# Patient Record
Sex: FEMALE | Race: WHITE | NOT HISPANIC OR LATINO | URBAN - METROPOLITAN AREA
[De-identification: names, ages, dates, MRNs, and addresses within clinical notes are randomized per-mention and may not be internally consistent; named-entity substitution may affect disease eponyms.]

---

## 2021-04-23 ENCOUNTER — EMERGENCY (EMERGENCY)
Facility: HOSPITAL | Age: 76
LOS: 1 days | Discharge: HOME | End: 2021-04-23
Attending: EMERGENCY MEDICINE | Admitting: EMERGENCY MEDICINE
Payer: MEDICARE

## 2021-04-23 VITALS
WEIGHT: 195.11 LBS | DIASTOLIC BLOOD PRESSURE: 84 MMHG | HEART RATE: 74 BPM | HEIGHT: 69 IN | TEMPERATURE: 97 F | RESPIRATION RATE: 18 BRPM | OXYGEN SATURATION: 97 % | SYSTOLIC BLOOD PRESSURE: 128 MMHG

## 2021-04-23 DIAGNOSIS — Z90.710 ACQUIRED ABSENCE OF BOTH CERVIX AND UTERUS: Chronic | ICD-10-CM

## 2021-04-23 DIAGNOSIS — Z90.49 ACQUIRED ABSENCE OF OTHER SPECIFIED PARTS OF DIGESTIVE TRACT: Chronic | ICD-10-CM

## 2021-04-23 LAB
ALBUMIN SERPL ELPH-MCNC: 4.1 G/DL — SIGNIFICANT CHANGE UP (ref 3.3–5)
ALP SERPL-CCNC: 85 U/L — SIGNIFICANT CHANGE UP (ref 40–120)
ALT FLD-CCNC: 17 U/L — SIGNIFICANT CHANGE UP (ref 12–78)
ANION GAP SERPL CALC-SCNC: 7 MMOL/L — SIGNIFICANT CHANGE UP (ref 5–17)
APTT BLD: 31.5 SEC — SIGNIFICANT CHANGE UP (ref 27.5–35.5)
AST SERPL-CCNC: 13 U/L — LOW (ref 15–37)
BASOPHILS # BLD AUTO: 0 K/UL — SIGNIFICANT CHANGE UP (ref 0–0.2)
BASOPHILS NFR BLD AUTO: 0 % — SIGNIFICANT CHANGE UP (ref 0–2)
BILIRUB SERPL-MCNC: 0.4 MG/DL — SIGNIFICANT CHANGE UP (ref 0.2–1.2)
BUN SERPL-MCNC: 26 MG/DL — HIGH (ref 7–23)
CALCIUM SERPL-MCNC: 10.5 MG/DL — HIGH (ref 8.5–10.1)
CHLORIDE SERPL-SCNC: 101 MMOL/L — SIGNIFICANT CHANGE UP (ref 96–108)
CO2 SERPL-SCNC: 29 MMOL/L — SIGNIFICANT CHANGE UP (ref 22–31)
CREAT SERPL-MCNC: 0.92 MG/DL — SIGNIFICANT CHANGE UP (ref 0.5–1.3)
EOSINOPHIL # BLD AUTO: 0 K/UL — SIGNIFICANT CHANGE UP (ref 0–0.5)
EOSINOPHIL NFR BLD AUTO: 0 % — SIGNIFICANT CHANGE UP (ref 0–6)
GLUCOSE SERPL-MCNC: 121 MG/DL — HIGH (ref 70–99)
HCT VFR BLD CALC: 38.9 % — SIGNIFICANT CHANGE UP (ref 34.5–45)
HGB BLD-MCNC: 13.2 G/DL — SIGNIFICANT CHANGE UP (ref 11.5–15.5)
INR BLD: 1.05 RATIO — SIGNIFICANT CHANGE UP (ref 0.88–1.16)
LIDOCAIN IGE QN: 82 U/L — SIGNIFICANT CHANGE UP (ref 73–393)
LYMPHOCYTES # BLD AUTO: 0.64 K/UL — LOW (ref 1–3.3)
LYMPHOCYTES # BLD AUTO: 8 % — LOW (ref 13–44)
MAGNESIUM SERPL-MCNC: 2.4 MG/DL — SIGNIFICANT CHANGE UP (ref 1.6–2.6)
MANUAL SMEAR VERIFICATION: SIGNIFICANT CHANGE UP
MCHC RBC-ENTMCNC: 29.6 PG — SIGNIFICANT CHANGE UP (ref 27–34)
MCHC RBC-ENTMCNC: 33.9 GM/DL — SIGNIFICANT CHANGE UP (ref 32–36)
MCV RBC AUTO: 87.2 FL — SIGNIFICANT CHANGE UP (ref 80–100)
MONOCYTES # BLD AUTO: 0.64 K/UL — SIGNIFICANT CHANGE UP (ref 0–0.9)
MONOCYTES NFR BLD AUTO: 8 % — SIGNIFICANT CHANGE UP (ref 2–14)
NEUTROPHILS # BLD AUTO: 6.35 K/UL — SIGNIFICANT CHANGE UP (ref 1.8–7.4)
NEUTROPHILS NFR BLD AUTO: 78 % — HIGH (ref 43–77)
NEUTS BAND # BLD: 1 % — SIGNIFICANT CHANGE UP (ref 0–8)
NRBC # BLD: 0 — SIGNIFICANT CHANGE UP
NRBC # BLD: SIGNIFICANT CHANGE UP /100 WBCS (ref 0–0)
PLAT MORPH BLD: NORMAL — SIGNIFICANT CHANGE UP
PLATELET # BLD AUTO: 245 K/UL — SIGNIFICANT CHANGE UP (ref 150–400)
POTASSIUM SERPL-MCNC: 3.7 MMOL/L — SIGNIFICANT CHANGE UP (ref 3.5–5.3)
POTASSIUM SERPL-SCNC: 3.7 MMOL/L — SIGNIFICANT CHANGE UP (ref 3.5–5.3)
PROT SERPL-MCNC: 8.2 G/DL — SIGNIFICANT CHANGE UP (ref 6–8.3)
PROTHROM AB SERPL-ACNC: 12.3 SEC — SIGNIFICANT CHANGE UP (ref 10.6–13.6)
RBC # BLD: 4.46 M/UL — SIGNIFICANT CHANGE UP (ref 3.8–5.2)
RBC # FLD: 13.5 % — SIGNIFICANT CHANGE UP (ref 10.3–14.5)
RBC BLD AUTO: NORMAL — SIGNIFICANT CHANGE UP
SODIUM SERPL-SCNC: 137 MMOL/L — SIGNIFICANT CHANGE UP (ref 135–145)
TROPONIN I SERPL-MCNC: <.015 NG/ML — SIGNIFICANT CHANGE UP (ref 0.01–0.04)
VARIANT LYMPHS # BLD: 5 % — SIGNIFICANT CHANGE UP (ref 0–6)
WBC # BLD: 8.04 K/UL — SIGNIFICANT CHANGE UP (ref 3.8–10.5)
WBC # FLD AUTO: 8.04 K/UL — SIGNIFICANT CHANGE UP (ref 3.8–10.5)

## 2021-04-23 PROCEDURE — 85025 COMPLETE CBC W/AUTO DIFF WBC: CPT

## 2021-04-23 PROCEDURE — 99283 EMERGENCY DEPT VISIT LOW MDM: CPT

## 2021-04-23 PROCEDURE — 80053 COMPREHEN METABOLIC PANEL: CPT

## 2021-04-23 PROCEDURE — 71045 X-RAY EXAM CHEST 1 VIEW: CPT | Mod: 26

## 2021-04-23 PROCEDURE — 99285 EMERGENCY DEPT VISIT HI MDM: CPT

## 2021-04-23 PROCEDURE — 85730 THROMBOPLASTIN TIME PARTIAL: CPT

## 2021-04-23 PROCEDURE — 76700 US EXAM ABDOM COMPLETE: CPT | Mod: 26

## 2021-04-23 PROCEDURE — 93005 ELECTROCARDIOGRAM TRACING: CPT

## 2021-04-23 PROCEDURE — 83690 ASSAY OF LIPASE: CPT

## 2021-04-23 PROCEDURE — 83735 ASSAY OF MAGNESIUM: CPT

## 2021-04-23 PROCEDURE — 96375 TX/PRO/DX INJ NEW DRUG ADDON: CPT

## 2021-04-23 PROCEDURE — 76700 US EXAM ABDOM COMPLETE: CPT

## 2021-04-23 PROCEDURE — 71045 X-RAY EXAM CHEST 1 VIEW: CPT

## 2021-04-23 PROCEDURE — 99284 EMERGENCY DEPT VISIT MOD MDM: CPT | Mod: 25

## 2021-04-23 PROCEDURE — 93010 ELECTROCARDIOGRAM REPORT: CPT

## 2021-04-23 PROCEDURE — 36415 COLL VENOUS BLD VENIPUNCTURE: CPT

## 2021-04-23 PROCEDURE — 85610 PROTHROMBIN TIME: CPT

## 2021-04-23 PROCEDURE — 96374 THER/PROPH/DIAG INJ IV PUSH: CPT

## 2021-04-23 PROCEDURE — 84484 ASSAY OF TROPONIN QUANT: CPT

## 2021-04-23 RX ORDER — PANTOPRAZOLE SODIUM 20 MG/1
1 TABLET, DELAYED RELEASE ORAL
Qty: 30 | Refills: 0
Start: 2021-04-23 | End: 2021-05-22

## 2021-04-23 RX ORDER — ONDANSETRON 8 MG/1
4 TABLET, FILM COATED ORAL ONCE
Refills: 0 | Status: COMPLETED | OUTPATIENT
Start: 2021-04-23 | End: 2021-04-23

## 2021-04-23 RX ORDER — FAMOTIDINE 10 MG/ML
20 INJECTION INTRAVENOUS ONCE
Refills: 0 | Status: COMPLETED | OUTPATIENT
Start: 2021-04-23 | End: 2021-04-23

## 2021-04-23 RX ORDER — ONDANSETRON 8 MG/1
1 TABLET, FILM COATED ORAL
Qty: 30 | Refills: 0
Start: 2021-04-23

## 2021-04-23 RX ADMIN — FAMOTIDINE 20 MILLIGRAM(S): 10 INJECTION INTRAVENOUS at 21:00

## 2021-04-23 RX ADMIN — ONDANSETRON 4 MILLIGRAM(S): 8 TABLET, FILM COATED ORAL at 21:01

## 2021-04-23 NOTE — ED PROVIDER NOTE - OBJECTIVE STATEMENT
pt is a 74 yo f who has hx of htn sp hyst appy remote former smoker whose pmd is dr kaba primo was in her usoh until 3 pm today when she became suddenly nauseated with stomach pain in epigastrium. she soon vomited and had regurg pain into chest (drawing a line up and down mid chest).  still feeling nauseated she went to urgent care where she had a rapid covid test (neg) and an ekg with inferior q waves otherwise normal then was sent to er for eval of her persistent nausea.  no ill contacts no trauma no travel no cp nosob no diarrhea no other co pt is a 76 yo f who has hx of htn sp hyst appy remote former smoker whose pmd is dr kaba primo was in her usoh until 3 pm today when she became suddenly nauseated with stomach pain in epigastrium. she soon vomited and had regurg pain into chest (drawing a line up and down mid chest).  still feeling nauseated she went to urgent care where she had a rapid covid test (neg) and an ekg with inferior q waves otherwise normal then was sent to er for eval of her persistent nausea.  no ill contacts no trauma no travel no cp no sob no diarrhea no other co

## 2021-04-23 NOTE — CONSULT NOTE ADULT - NSICDXPROBLEMREC1_GEN_ALL_CORE_FT
Assessment:   74 y/o F pmhx htn presenting to [facility] ED with epigastric/upper abdominal pain with nausea and one episode of vomiting since earlier today now resolved, with normal wbc, normal lfts and GB us findings of cholelithiasis, sludge and negative sonographic pond sign, likely biliary colic,   Plan:  - given first episode of biliary colic and now resolved abdominal pain and nausea coupled with GB US negative for acute danyell and normal wbc, lfts, surgically cleared for d/c home to follow up outpt for elective cholecystectomy  - follow up with Dr. Kuhn in office  - cont care per ED  - discussed with Dr. Kuhn Assessment:   74 y/o F pmhx htn presenting to [facility] ED with epigastric/upper abdominal pain with nausea and one episode of vomiting since earlier today now resolved, with normal wbc, normal lfts and GB us findings of cholelithiasis, sludge and negative sonographic   pond sign, likely biliary colic,   Plan:  - given first episode of biliary colic and now resolved abdominal pain and nausea coupled with GB US negative for acute danyell and normal wbc, lfts, surgically cleared for d/c home to follow up outpt for elective cholecystectomy  - follow up with Dr. Kuhn in office  - cont care per ED  - discussed with Dr. Kuhn

## 2021-04-23 NOTE — ED ADULT NURSE NOTE - OBJECTIVE STATEMENT
pt RUQ abd pain, radiates up mid chest. emesisx1. went to  and was sent to ED. pt RUQ abd pain, radiates up mid chest. unable to describe. began at 3pm. emesisx1. went to  and was sent to ED.

## 2021-04-23 NOTE — ED PROVIDER NOTE - PROGRESS NOTE DETAILS
still having discomfort, US results explained, surg PA will see patient seen by surg PA, recommend no acute surgical intervention, can f/u with Dr. Alicia out patient

## 2021-04-23 NOTE — ED PROVIDER NOTE - NSFOLLOWUPINSTRUCTIONS_ED_ALL_ED_FT
zofran for nausea  protonix for acid  follow up with your primary care doctor  Clear liquid diet advance slowly as tolerated to  Bananas Rice Tea and Toast (with margarine or jam)  then advance to Baked and boiled (eggs, pasta, chicken)  once tolerated you may advance slowly to regular  Eat small volumes   NO fatty fried foods, no milk or mild products, no tomato, spice, mint, tobacco, alcohol, caffeine  Stay well hydrated: a teaspoon at a time until able to tolerate normal intake.  Prompt follow up with your doctor is essential  if given zofran, use this every 6 hours for nausea.  return for worsening symptoms or any problems/concerns zofran for nausea  protonix for acid  follow up with your primary care doctor and Dr. Alicia  Clear liquid diet advance slowly as tolerated to  Bananas Rice Tea and Toast (with margarine or jam)  then advance to Baked and boiled (eggs, pasta, chicken)  once tolerated you may advance slowly to regular  Eat small volumes   NO fatty fried foods, no milk or mild products, no tomato, spice, mint, tobacco, alcohol, caffeine  Stay well hydrated: a teaspoon at a time until able to tolerate normal intake.  Prompt follow up with your doctor is essential  if given zofran, use this every 6 hours for nausea.  return for worsening symptoms or any problems/concerns

## 2021-04-23 NOTE — ED PROVIDER NOTE - CARE PLAN
Principal Discharge DX:	Epigastric abdominal pain  Secondary Diagnosis:	Nausea and vomiting   Principal Discharge DX:	Epigastric abdominal pain  Secondary Diagnosis:	Nausea and vomiting  Secondary Diagnosis:	Cholelithiasis

## 2021-04-23 NOTE — ED ADULT NURSE NOTE - NS TRANSFER PATIENT BELONGINGS
Clothing 1 pair earrings,/Jewelry/Money (specify)/Clothing 2 pair earrings/Cell Phone/PDA (specify)/Jewelry/Money (specify)/Clothing

## 2021-04-23 NOTE — CONSULT NOTE ADULT - SUBJECTIVE AND OBJECTIVE BOX
74 y/o F pmhx htn, presents to [facility] ED c/o epigastric, upper abdominal pain since 3 pm today. PT states she was in usoh prior to onset of symptoms, describing the pain as going "up and down her abdomen to her throat", without radiation. PT admits to pain in the RUQ with nausea and episode of vomiting. PT describes the emesis as "sausage she had for breakfast". Pt states she initially went to an urgent care and underwent a covid test (negative) and an ekg however due to persistent nausea was prompted to visit the ED. At this time pt states the abdominal pain is improved and nausea subsided. PT denies previous hx of similar symptoms. PT denies chest pain, SOB, palpitations, fevers, chills, melena, hematochezia, recent travel or sick contacts.         PAST MEDICAL & SURGICAL HISTORY:  Hypertension  S/P hysterectomy  S/P appy    Allergies:  No Known Allergies    Home Medications:  metoprolol tartrate 50 mg oral tablet: 1 tab(s) orally once a day  quinapril-hydrochlorothiazide 20mg-25mg oral tablet: 1 tab(s) orally once a day      Family History:  FAMILY HISTORY:      ROS:  Constitutional: Denies fever, fatigue or weight loss.  Skin: Denies rash.  Eyes: Denies recent vision problems or eye pain.  ENT: Denies congestion, ear pain, or sore throat.  Endocrine: Denies thyroid problems.  Cardiovascular: Denies chest pain or palpation.  Respiratory: Denies cough, shortness of breath, congestion, or wheezing.  Gastrointestinal: SEE HPI  Genitourinary: Denies dysuria.  Musculoskeletal: Denies joint swelling.  Neurologic: Denies headache.      Physical Examination:  GENERAL: No acute distress, well-developed  HEAD:  Atraumatic, Normocephalic  CHEST/LUNG: CTABL, no ronchi, rales or wheezing  HEART: RRR, no MRGs  ABDOMEN: soft, minimal ruq ttp, nondistended, +bs  NEUROLOGY: A&O x 3, no focal deficits    Data:                        13.2   8.04  )-----------( 245      ( 23 Apr 2021 21:00 )             38.9     04-23    137  |  101  |  26<H>  ----------------------------<  121<H>  3.7   |  29  |  0.92    Ca    10.5<H>      23 Apr 2021 21:00  Mg     2.4     04-23    TPro  8.2  /  Alb  4.1  /  TBili  0.4  /  DBili  x   /  AST  13<L>  /  ALT  17  /  AlkPhos  85  04-23      LIVER FUNCTIONS - ( 23 Apr 2021 21:00 )  Alb: 4.1 g/dL / Pro: 8.2 g/dL / ALK PHOS: 85 U/L / ALT: 17 U/L / AST: 13 U/L / GGT: x           Radiology:  < from: US Abdomen Complete (US Abdomen Complete .) (04.23.21 @ 22:52) >    EXAM:  US ABDOMEN COMPLETE                            PROCEDURE DATE:  04/23/2021      INTERPRETATION:  CLINICAL INFORMATION: Abdominal pain and vomiting    COMPARISON: None available.    TECHNIQUE: Sonography of the abdomen.    FINDINGS:    Liver: Increased echogenicity.  Bile ducts: Normal caliber. Common bile duct measures 4 (mm).  Gallbladder: Cholelithiasis and sludge. Mild wall thickening of 4 mm. No focal tenderness.  Pancreas: Visualized portions are within normal limits.  Spleen: 8.9 cm. Within normal limits.  Right kidney: 10.2 cm. No hydronephrosis.  Left kidney: 8.1 cm.  No hydronephrosis.  Ascites: None.  Aorta and IVC: Visualized portions are within normal limits.    IMPRESSION:  Cholelithiasis and gallbladder wall thickening with a negative sonographic Schwab sign.    CHRISTOPHER GARRETT MD; Attending Radiologist  This document has been electronically signed. Apr 23 2021 11:18PM    < end of copied text >

## 2021-04-23 NOTE — ED PROVIDER NOTE - CLINICAL SUMMARY MEDICAL DECISION MAKING FREE TEXT BOX
epigastric abd pain followed by nausea and vomiting with continued pain sent from urgent care for evaluation  no changes on ekg large bmi no sig other pmhx  ro acute gb ro pn ro ptx ro pancreatitis  labs us ekg re eval ppi antiemetic

## 2021-04-23 NOTE — ED ADULT TRIAGE NOTE - CHIEF COMPLAINT QUOTE
Pt states she was at urgent care for abdominal pain radiating to chest, sent for eval of abnormal EKG and vomiting

## 2021-04-23 NOTE — ED PROVIDER NOTE - CONSTITUTIONAL, MLM
normal... Well appearing, awake, large bmi alert, oriented to person, place, time/situation and in no apparent distress. pleasant making jokes with  at bedside

## 2021-04-23 NOTE — ED PROVIDER NOTE - CARE PROVIDER_API CALL
Dayton Kuhn)  ColonRectal Surgery; Surgery  [address] [place] [address]  Phone: [phone]  Fax: [phone]  Follow Up Time:

## 2021-04-23 NOTE — ED PROVIDER NOTE - PATIENT PORTAL LINK FT
You can access the FollowMyHealth Patient Portal offered by [facility] by registering at the following website: [email or web address]. By joining [facility]’s FollowMyHealth portal, you will also be able to view your health information using other applications (apps) compatible with our system.

## 2021-04-24 VITALS
OXYGEN SATURATION: 98 % | HEART RATE: 79 BPM | RESPIRATION RATE: 16 BRPM | DIASTOLIC BLOOD PRESSURE: 84 MMHG | SYSTOLIC BLOOD PRESSURE: 146 MMHG | TEMPERATURE: 98 F

## 2021-04-24 DIAGNOSIS — K80.50 CALCULUS OF BILE DUCT WITHOUT CHOLANGITIS OR CHOLECYSTITIS WITHOUT OBSTRUCTION: ICD-10-CM

## 2021-05-04 PROBLEM — I10 ESSENTIAL (PRIMARY) HYPERTENSION: Chronic | Status: ACTIVE | Noted: 2021-04-23

## 2021-05-10 ENCOUNTER — OUTPATIENT (OUTPATIENT)
Dept: OUTPATIENT SERVICES | Facility: HOSPITAL | Age: 76
LOS: 1 days | End: 2021-05-10
Payer: MEDICARE

## 2021-05-10 VITALS
OXYGEN SATURATION: 97 % | TEMPERATURE: 98 F | HEART RATE: 80 BPM | WEIGHT: 201.06 LBS | SYSTOLIC BLOOD PRESSURE: 145 MMHG | HEIGHT: 68 IN | RESPIRATION RATE: 16 BRPM | DIASTOLIC BLOOD PRESSURE: 82 MMHG

## 2021-05-10 DIAGNOSIS — K80.12 CALCULUS OF GALLBLADDER WITH ACUTE AND CHRONIC CHOLECYSTITIS WITHOUT OBSTRUCTION: ICD-10-CM

## 2021-05-10 DIAGNOSIS — Z90.49 ACQUIRED ABSENCE OF OTHER SPECIFIED PARTS OF DIGESTIVE TRACT: Chronic | ICD-10-CM

## 2021-05-10 DIAGNOSIS — Z90.710 ACQUIRED ABSENCE OF BOTH CERVIX AND UTERUS: Chronic | ICD-10-CM

## 2021-05-10 DIAGNOSIS — Z01.818 ENCOUNTER FOR OTHER PREPROCEDURAL EXAMINATION: ICD-10-CM

## 2021-05-10 PROCEDURE — 86900 BLOOD TYPING SEROLOGIC ABO: CPT

## 2021-05-10 PROCEDURE — 36415 COLL VENOUS BLD VENIPUNCTURE: CPT

## 2021-05-10 PROCEDURE — 86850 RBC ANTIBODY SCREEN: CPT

## 2021-05-10 PROCEDURE — G0463: CPT

## 2021-05-10 PROCEDURE — 86901 BLOOD TYPING SEROLOGIC RH(D): CPT

## 2021-05-10 NOTE — H&P PST ADULT - HISTORY OF PRESENT ILLNESS
76 yo female with HTN, presents to [facility] scheduled for a laparoscopic cholecystectomy on 5/18 with Dr. Nation. Presented to the ED with acute abdominal pain with vomitting after first going to , who referred patient to the ER. Denies previous H/O gastro issues. Denies gastritis, nausea and bowel changes.

## 2021-05-10 NOTE — H&P PST ADULT - NSICDXPROBLEM_GEN_ALL_CORE_FT
PROBLEM DIAGNOSES  Problem: Calculus of gallbladder with acute and chronic cholecystitis without obstruction  Assessment and Plan: scheduled for a laparoscopic cholecystectomy on 5/18 with Dr. Nation        Problem: Pre-op evaluation  Assessment and Plan: Labs  To make appt for COVID PCR 48-72 before DOS.  Pre op and Hibiclens instructions reviewed and given. Take routine am meds DOS with sip of water. Instructed to hold and/or avoid other NSAIDs and OTC supplements. Tylenol is ok. Verbalized understanding        PROBLEM DIAGNOSES  Problem: Calculus of gallbladder with acute and chronic cholecystitis without obstruction  Assessment and Plan: scheduled for a laparoscopic cholecystectomy on 5/18 with Dr. Nation        Problem: Pre-op evaluation  Assessment and Plan: Labs  - CBC, CMP, PT/PTT, EKG and CXR on chart form 4/23. T&S pending. To make appt for COVID PCR 48-72 before DOS.  MC requested from PCP.   Pre op and Hibiclens instructions reviewed and given. Take routine am Metoprolol DOS with sip of water. Hold the diuertic DOS. Instructed to hold and/or avoid other NSAIDs and OTC supplements. Tylenol is ok. Verbalized understanding

## 2021-05-10 NOTE — H&P PST ADULT - NS PRO FEM REPRO HEALTH SCREEN
Transition of Care  RUR: N/A- Observation status  Disposition: home with family assistance  Follow up: pcp  Transportation: Family, wife    Reason for Admission: prostate cancer, DAVINCI RADICAL ROBOTIC ASSISTED LAPAROSCOPIC PROSTATECTOMY                   RUR Score: N/A- Observation                  Plan for utilizing home health: not at this time         PCP: First and Last name:  Lazara Molina MD   Name of Practice: [facility]   Are you a current patient: Yes/No: Yes   Approximate date of last visit: November 2020   Can you participate in a virtual visit with your PCP: Yes                    Current Advanced Directive/Advance Care Plan: No Order    Healthcare Decision Maker:   Click here to complete [address] including selection of the Healthcare Decision Maker Relationship (ie \"Primary\")                  Transition of Care Plan: CM met with the patient and his wife. He lives in a two story home. The patient is independent and does not require DME to ambulate. There are no identified barriers or needs for discharge. CM will continue to follow. Observation notice provided in writing to patient and/or caregiver as well as verbal explanation of the policy. Patients who are in outpatient status also receive the Observation notice. Care Management Interventions  PCP Verified by CM: Regla Simmons MD)  Palliative Care Criteria Met (RRAT>21 & CHF Dx)?: No  Mode of Transport at Discharge:  Other (see comment)(family, wife)  Transition of Care Consult (CM Consult): Discharge Planning  MyChart Signup: No  Discharge Durable Medical Equipment: No  Physical Therapy Consult: No  Occupational Therapy Consult: No  Speech Therapy Consult: No  Current Support Network: Lives with Spouse  Confirm Follow Up Transport: Family  The Patient and/or Patient Representative was Provided with a Choice of Provider and Agrees with the Discharge Plan?: Yes  Discharge Location  Discharge Placement: Home with family assistance    [address] [place] [address], [place], MSW Supervisee mammogram

## 2021-05-10 NOTE — H&P PST ADULT - GENERAL COMMENTS
Fully vaccinated; Denies recent international travel, recent illness and sick contact with COVID in the last 3 weeks

## 2021-05-10 NOTE — H&P PST ADULT - NSICDXPASTMEDICALHX_GEN_ALL_CORE_FT
PAST MEDICAL HISTORY:  Calculus of gallbladder with acute and chronic cholecystitis without obstruction     DVT, lower extremity Left leg 3 years ago; Treated with Xarelto for one month    Hypertension

## 2021-05-13 PROBLEM — I82.409 ACUTE EMBOLISM AND THROMBOSIS OF UNSPECIFIED DEEP VEINS OF UNSPECIFIED LOWER EXTREMITY: Chronic | Status: ACTIVE | Noted: 2021-05-10

## 2021-05-13 PROBLEM — K80.12 CALCULUS OF GALLBLADDER WITH ACUTE AND CHRONIC CHOLECYSTITIS WITHOUT OBSTRUCTION: Chronic | Status: ACTIVE | Noted: 2021-05-10

## 2021-05-16 ENCOUNTER — OUTPATIENT (OUTPATIENT)
Dept: OUTPATIENT SERVICES | Facility: HOSPITAL | Age: 76
LOS: 1 days | End: 2021-05-16
Payer: MEDICARE

## 2021-05-16 DIAGNOSIS — Z20.828 CONTACT WITH AND (SUSPECTED) EXPOSURE TO OTHER VIRAL COMMUNICABLE DISEASES: ICD-10-CM

## 2021-05-16 DIAGNOSIS — Z90.49 ACQUIRED ABSENCE OF OTHER SPECIFIED PARTS OF DIGESTIVE TRACT: Chronic | ICD-10-CM

## 2021-05-16 DIAGNOSIS — Z90.710 ACQUIRED ABSENCE OF BOTH CERVIX AND UTERUS: Chronic | ICD-10-CM

## 2021-05-16 LAB — SARS-COV-2 RNA SPEC QL NAA+PROBE: SIGNIFICANT CHANGE UP

## 2021-05-16 PROCEDURE — U0005: CPT

## 2021-05-16 PROCEDURE — U0003: CPT

## 2021-05-17 NOTE — ASU PATIENT PROFILE, ADULT - PMH
Calculus of gallbladder with acute and chronic cholecystitis without obstruction    DVT, lower extremity  Left leg 3 years ago; Treated with Xarelto for one month  Hypertension

## 2021-05-17 NOTE — ASU PATIENT PROFILE, ADULT - PROVIDER NOTIFICATION
Discharge Instructions For Your Heart Catheterization/Stent with Radial/Wrist Site    Activity: For the next 24 hours  Follow these guidelines related to the sedation medicine that you've received.   · You must have someone drive you home.  · You may feel tired, unsteady, or not quite yourself for the remainder of the day due to the sedation medicine. Your body gets rid of the medicine usually in 24 hours.  · Do not drive or operate machinery or power tools.  · Do not drink alcohol or smoke.  · Do not make any important decisions or sign important papers.    Activity:   Follow these guidelines related to the puncture site in your wrist.  · Minimal use of the arm used for the procedure for the remainder of the day. If possible, elevate your arm on a pillow and don't bend your wrist.  · No lifting more than 5 pounds for 5 days with the arm used for the procedure.  · If you do heavy physical work on your job, follow your doctor's instructions about when you may return to work.  · Use ice pack for discomfort.  · If you have a wrist immobilizer, remove the following morning.    Procedure Site Care:  · Keep the dressing on your procedure site for the remainder of the day. The following day, you may remove the dressing and shower. Gently cleanse the procedure site with soap and water and a clean wash cloth and pat dry.   · Apply a new Band-aid on the puncture site for 1 day;  Do not apply lotions, powders, or creams.  · No soaking the wrist in water (i.e., bathtub, hot tub, dish water, pool of water) until the wound has completely healed (3-5 days).    Common side effects you may notice  · Soreness at puncture site.  · Slight bruising at the site for several days to several weeks.  · Lump the size of a pea or marble at the puncture site for a few weeks.    Diet/Fluids  · Resume diet as prior to admission.  · If you had a catheterization or stent, drink plenty of liquids to help flush the dye used for your procedure out of  your body (unless you're on a fluid restriction ordered by your physician).    Medications  · Take mild pain reliever such as Tylenol/Acetaminophen as needed for pain.    Call your doctor with these issues  · Bleeding from the procedure site. If significant bleeding occurs or there is a growing lump at your site, apply firm pressure at the site where your dressing is. Call 911 and keep pressure on the site.  · Numbness, tingling or color change in the arm used for puncture site.  · Increasing pain and firmness near the puncture site.  · A temperature greater than 101 degrees.  · Redness or drainage around site.       Declines

## 2021-05-18 ENCOUNTER — OUTPATIENT (OUTPATIENT)
Dept: OUTPATIENT SERVICES | Facility: HOSPITAL | Age: 76
LOS: 1 days | End: 2021-05-18
Payer: MEDICARE

## 2021-05-18 VITALS
HEART RATE: 66 BPM | RESPIRATION RATE: 18 BRPM | SYSTOLIC BLOOD PRESSURE: 133 MMHG | DIASTOLIC BLOOD PRESSURE: 70 MMHG | OXYGEN SATURATION: 98 %

## 2021-05-18 VITALS
DIASTOLIC BLOOD PRESSURE: 87 MMHG | OXYGEN SATURATION: 96 % | TEMPERATURE: 98 F | SYSTOLIC BLOOD PRESSURE: 172 MMHG | RESPIRATION RATE: 16 BRPM | HEART RATE: 91 BPM

## 2021-05-18 DIAGNOSIS — K80.12 CALCULUS OF GALLBLADDER WITH ACUTE AND CHRONIC CHOLECYSTITIS WITHOUT OBSTRUCTION: ICD-10-CM

## 2021-05-18 DIAGNOSIS — Z90.710 ACQUIRED ABSENCE OF BOTH CERVIX AND UTERUS: Chronic | ICD-10-CM

## 2021-05-18 DIAGNOSIS — Z90.49 ACQUIRED ABSENCE OF OTHER SPECIFIED PARTS OF DIGESTIVE TRACT: Chronic | ICD-10-CM

## 2021-05-18 PROCEDURE — 88304 TISSUE EXAM BY PATHOLOGIST: CPT

## 2021-05-18 PROCEDURE — 47562 LAPAROSCOPIC CHOLECYSTECTOMY: CPT

## 2021-05-18 PROCEDURE — 47562 LAPAROSCOPIC CHOLECYSTECTOMY: CPT | Mod: AS

## 2021-05-18 PROCEDURE — 88304 TISSUE EXAM BY PATHOLOGIST: CPT | Mod: 26

## 2021-05-18 PROCEDURE — C1889: CPT

## 2021-05-18 RX ORDER — METOCLOPRAMIDE HCL 10 MG
5 TABLET ORAL ONCE
Refills: 0 | Status: DISCONTINUED | OUTPATIENT
Start: 2021-05-18 | End: 2021-05-18

## 2021-05-18 RX ORDER — OXYCODONE AND ACETAMINOPHEN 5; 325 MG/1; MG/1
1 TABLET ORAL
Qty: 15 | Refills: 0
Start: 2021-05-18

## 2021-05-18 RX ORDER — CEFAZOLIN SODIUM 1 G
2000 VIAL (EA) INJECTION ONCE
Refills: 0 | Status: COMPLETED | OUTPATIENT
Start: 2021-05-18 | End: 2021-05-18

## 2021-05-18 RX ORDER — METOPROLOL TARTRATE 50 MG
1 TABLET ORAL
Qty: 0 | Refills: 0 | DISCHARGE

## 2021-05-18 RX ORDER — OXYCODONE HYDROCHLORIDE 5 MG/1
5 TABLET ORAL ONCE
Refills: 0 | Status: DISCONTINUED | OUTPATIENT
Start: 2021-05-18 | End: 2021-05-18

## 2021-05-18 RX ORDER — SODIUM CHLORIDE 9 MG/ML
1000 INJECTION, SOLUTION INTRAVENOUS
Refills: 0 | Status: DISCONTINUED | OUTPATIENT
Start: 2021-05-18 | End: 2021-05-18

## 2021-05-18 RX ORDER — HYDROMORPHONE HYDROCHLORIDE 2 MG/ML
0.5 INJECTION INTRAMUSCULAR; INTRAVENOUS; SUBCUTANEOUS
Refills: 0 | Status: DISCONTINUED | OUTPATIENT
Start: 2021-05-18 | End: 2021-05-18

## 2021-05-18 RX ORDER — QUINAPRIL HYDROCHLORIDE AND HYDROCHLOROTHIAZIDE 20; 12.5 MG/1; MG/1
1 TABLET ORAL
Qty: 0 | Refills: 0 | DISCHARGE

## 2021-05-18 RX ADMIN — SODIUM CHLORIDE 120 MILLILITER(S): 9 INJECTION, SOLUTION INTRAVENOUS at 13:01

## 2021-05-18 RX ADMIN — SODIUM CHLORIDE 60 MILLILITER(S): 9 INJECTION, SOLUTION INTRAVENOUS at 10:14

## 2021-05-18 NOTE — ASU DISCHARGE PLAN (ADULT/PEDIATRIC) - ASU DC SPECIAL INSTRUCTIONSFT
You may shower in 24 hours.   Do not let water hit wounds directly, do not rub incisions.      No heavy lifting (nothing heavier than 5 lbs.), no strenuous physical activity, no exercise.      You may perform your usual daily tasks as tolerated.      Take medications as prescribed for pain.     Follow-up with Dr. Nation in his offce

## 2021-05-18 NOTE — BRIEF OPERATIVE NOTE - NSICDXBRIEFPREOP_GEN_ALL_CORE_FT
PRE-OP DIAGNOSIS:  Cholelithiasis with acute on chronic cholecystitis 18-May-2021 12:24:15  Orville Nation

## 2021-05-18 NOTE — ASU DISCHARGE PLAN (ADULT/PEDIATRIC) - CARE PROVIDER_API CALL
Orville Nation (MD)  Surgery  [address], [address]  [place] [address]  Phone: [phone]  Fax: [phone]  Follow Up Time:

## 2021-05-18 NOTE — BRIEF OPERATIVE NOTE - NSICDXBRIEFPROCEDURE_GEN_ALL_CORE_FT
PROCEDURES:  Laparoscopic cholecystectomy without cholangiography 18-May-2021 12:24:08  Orville Nation

## 2021-05-18 NOTE — BRIEF OPERATIVE NOTE - NSICDXBRIEFPOSTOP_GEN_ALL_CORE_FT
POST-OP DIAGNOSIS:  Cholelithiasis with acute on chronic cholecystitis 18-May-2021 12:24:20  Orville Nation
